# Patient Record
Sex: FEMALE | Race: WHITE | NOT HISPANIC OR LATINO | Employment: FULL TIME | URBAN - METROPOLITAN AREA
[De-identification: names, ages, dates, MRNs, and addresses within clinical notes are randomized per-mention and may not be internally consistent; named-entity substitution may affect disease eponyms.]

---

## 2021-02-01 PROBLEM — L80 VITILIGO: Status: ACTIVE | Noted: 2021-02-01

## 2021-02-01 PROBLEM — F32.5 MAJOR DEPRESSIVE DISORDER IN FULL REMISSION: Status: ACTIVE | Noted: 2021-02-01

## 2024-04-16 ENCOUNTER — OFFICE VISIT (OUTPATIENT)
Dept: URGENT CARE | Facility: CLINIC | Age: 49
End: 2024-04-16
Payer: OTHER MISCELLANEOUS

## 2024-04-16 VITALS
TEMPERATURE: 98 F | HEIGHT: 64 IN | OXYGEN SATURATION: 98 % | HEART RATE: 80 BPM | DIASTOLIC BLOOD PRESSURE: 65 MMHG | BODY MASS INDEX: 23.39 KG/M2 | RESPIRATION RATE: 16 BRPM | WEIGHT: 137 LBS | SYSTOLIC BLOOD PRESSURE: 114 MMHG

## 2024-04-16 DIAGNOSIS — Z02.83 ENCOUNTER FOR DRUG SCREENING: ICD-10-CM

## 2024-04-16 DIAGNOSIS — Z02.6 ENCOUNTER RELATED TO WORKER'S COMPENSATION CLAIM: ICD-10-CM

## 2024-04-16 DIAGNOSIS — S99.922A INJURY OF LEFT FOOT, INITIAL ENCOUNTER: Primary | ICD-10-CM

## 2024-04-16 DIAGNOSIS — R26.89 ANTALGIC GAIT: ICD-10-CM

## 2024-04-16 LAB
CTP QC/QA: YES
POC 10 PANEL DRUG SCREEN: NEGATIVE

## 2024-04-16 PROCEDURE — 99204 OFFICE O/P NEW MOD 45 MIN: CPT | Mod: S$GLB,,, | Performed by: FAMILY MEDICINE

## 2024-04-16 PROCEDURE — 80305 DRUG TEST PRSMV DIR OPT OBS: CPT | Mod: QW,S$GLB,, | Performed by: FAMILY MEDICINE

## 2024-04-16 PROCEDURE — 73630 X-RAY EXAM OF FOOT: CPT | Mod: LT,S$GLB,, | Performed by: RADIOLOGY

## 2024-04-16 RX ORDER — DICLOFENAC SODIUM 10 MG/G
2 GEL TOPICAL 4 TIMES DAILY
Qty: 200 G | Refills: 1 | Status: SHIPPED | OUTPATIENT
Start: 2024-04-16

## 2024-04-16 NOTE — PROGRESS NOTES
Subjective:      Patient ID: Torres Garvin is a 49 y.o. female.    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 She did trip forward as well.   They started to walk and she felt like she was stepping on a knife on the bottom of her left foot. No injury mechanism that she knows of.  She states that it feels fine until she starts walking.  That is when she starts with the sharp stabbing pains. When she hangs her foot down, she can feel the pressure on the bottom of the foot.   Pt states that she has been icing and elevating the left foot with no relief.   What have you taken OTC for your symptoms: mainly tylenol. She also took naproxen for the first few days.   What is your current pain scale out of 10? 2        Other  This is a new problem. The current episode started 1 to 4 weeks ago. The problem occurs constantly. The problem has been unchanged. Nothing aggravates the symptoms. She has tried NSAIDs and acetaminophen for the symptoms. The treatment provided mild relief.     ROS  Objective:     Physical Exam  Musculoskeletal:        Feet:       Pt with tenderness at the base of the 5th metatarsal.   Pain with weight bearing  and manipulation of the sole of the lateral foot.   Mild swelling.   Assessment:      1. Injury of left foot, initial encounter    2. Encounter related to worker's compensation claim    3. Encounter for drug screening    4. Antalgic gait      Plan:   Advised to wear better shoes- she has flores.   Likes to avoid nsaids due diminished renal status secondary to kidney infection,.     Medications Ordered This Encounter   Medications    diclofenac sodium (VOLTAREN) 1 % Gel     Sig: Apply 2 g topically 4 (four) times daily.     Dispense:  200 g      Refill:  1       Return in 2 weeks.   Patient Instructions: Daily home exercises/warm soaks, Use splint as directed   Restrictions: Sit or stand as needed

## 2024-05-02 ENCOUNTER — OFFICE VISIT (OUTPATIENT)
Dept: URGENT CARE | Facility: CLINIC | Age: 49
End: 2024-05-02
Payer: OTHER MISCELLANEOUS

## 2024-05-02 VITALS
HEART RATE: 81 BPM | SYSTOLIC BLOOD PRESSURE: 109 MMHG | BODY MASS INDEX: 23.39 KG/M2 | WEIGHT: 137 LBS | DIASTOLIC BLOOD PRESSURE: 65 MMHG | OXYGEN SATURATION: 95 % | RESPIRATION RATE: 16 BRPM | HEIGHT: 64 IN | TEMPERATURE: 98 F

## 2024-05-02 DIAGNOSIS — Z02.6 ENCOUNTER RELATED TO WORKER'S COMPENSATION CLAIM: Primary | ICD-10-CM

## 2024-05-02 DIAGNOSIS — S99.922D INJURY OF LEFT FOOT, SUBSEQUENT ENCOUNTER: ICD-10-CM

## 2024-05-02 PROCEDURE — 99213 OFFICE O/P EST LOW 20 MIN: CPT | Mod: S$GLB,,, | Performed by: FAMILY MEDICINE

## 2024-05-02 NOTE — PROGRESS NOTES
Subjective:      Patient ID: Torres Garvin is a 49 y.o. female.    Chief Complaint: Foot Pain    Patient's place of employment - [employer]  Patient's job title - teacher  Date of Injury - 4/8/2024  Body part injured - left foot  Current work status per last visit - sit or stand, as needed, wear boot as needed  Improved, same, or worse - improved  Pain Scale right now (1-10) - 0  Pt states that she thinks that she is having slight pain because she stopped wearing the boot.  She is doing much better.  She is not wearing the boot anymore. When she is on her feet all day, soreness is there, but no pain. She then ices her foot and the soreness goes away.   She is ready to be released fully to return to work.         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 She did trip forward as well.   They started to walk and she felt like she was stepping on a knife on the bottom of her left foot. No injury mechanism that she knows of.  She states that it feels fine until she starts walking.  That is when she starts with the sharp stabbing pains. When she hangs her foot down, she can feel the pressure on the bottom of the foot.   Pt states that she has been icing and elevating the left foot with no relief.   What have you taken OTC for your symptoms: mainly tylenol. She also took naproxen for the first few days.   What is your current pain scale out of 10? 2      Foot Injury   The incident occurred more than 1 week ago. The incident occurred at work. There was no injury mechanism. The pain is present in the left foot. The pain is at a severity of 0/10. The patient is experiencing no pain. She reports no foreign bodies present. Nothing aggravates the symptoms. She has tried nothing for the symptoms. The  treatment provided no relief.     ROS  Objective:     Physical Exam   FROM left foot  Able to fully bear weight on leg lift  Assessment:      1. Encounter related to worker's compensation claim    2. Injury of left foot, subsequent encounter      Plan:                 No follow-ups on file.